# Patient Record
Sex: MALE | Race: WHITE
[De-identification: names, ages, dates, MRNs, and addresses within clinical notes are randomized per-mention and may not be internally consistent; named-entity substitution may affect disease eponyms.]

---

## 2020-06-23 ENCOUNTER — HOSPITAL ENCOUNTER (OUTPATIENT)
Dept: HOSPITAL 8 - CFH | Age: 65
Discharge: HOME | End: 2020-06-23
Attending: INTERNAL MEDICINE
Payer: MEDICARE

## 2020-06-23 DIAGNOSIS — R93.1: Primary | ICD-10-CM

## 2020-06-23 PROCEDURE — 78452 HT MUSCLE IMAGE SPECT MULT: CPT

## 2020-06-23 PROCEDURE — 93017 CV STRESS TEST TRACING ONLY: CPT

## 2020-06-23 PROCEDURE — A9502 TC99M TETROFOSMIN: HCPCS

## 2023-05-08 PROBLEM — G56.02 LEFT CARPAL TUNNEL SYNDROME: Status: ACTIVE | Noted: 2023-05-08

## 2023-05-08 PROBLEM — M25.532 LEFT WRIST PAIN: Status: ACTIVE | Noted: 2023-05-08

## 2024-04-12 PROBLEM — M19.132: Status: ACTIVE | Noted: 2024-04-12

## 2024-04-12 PROBLEM — M18.12 OSTEOARTHRITIS OF THUMB, LEFT: Status: ACTIVE | Noted: 2024-04-12

## 2024-05-07 PROBLEM — G57.61 MORTON'S NEUROMA OF THIRD INTERSPACE OF RIGHT FOOT: Status: ACTIVE | Noted: 2024-05-07

## 2025-04-06 ENCOUNTER — APPOINTMENT (OUTPATIENT)
Dept: RADIOLOGY | Facility: MEDICAL CENTER | Age: 70
End: 2025-04-06
Attending: EMERGENCY MEDICINE
Payer: COMMERCIAL

## 2025-04-06 ENCOUNTER — HOSPITAL ENCOUNTER (EMERGENCY)
Facility: MEDICAL CENTER | Age: 70
End: 2025-04-06
Attending: EMERGENCY MEDICINE
Payer: COMMERCIAL

## 2025-04-06 VITALS
WEIGHT: 155 LBS | HEIGHT: 67 IN | TEMPERATURE: 98.3 F | RESPIRATION RATE: 17 BRPM | BODY MASS INDEX: 24.33 KG/M2 | OXYGEN SATURATION: 99 % | SYSTOLIC BLOOD PRESSURE: 108 MMHG | HEART RATE: 62 BPM | DIASTOLIC BLOOD PRESSURE: 69 MMHG

## 2025-04-06 DIAGNOSIS — S16.1XXA STRAIN OF NECK MUSCLE, INITIAL ENCOUNTER: ICD-10-CM
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
## 2025-04-06 DIAGNOSIS — S06.5XAA SUBDURAL HEMATOMA (HCC): ICD-10-CM

## 2025-04-06 PROBLEM — T14.90XA TRAUMA: Status: ACTIVE | Noted: 2025-04-06

## 2025-04-06 PROBLEM — S06.5X9A: Status: ACTIVE | Noted: 2025-04-06

## 2025-04-06 LAB
ALBUMIN SERPL BCP-MCNC: 4.3 G/DL (ref 3.2–4.9)
ALBUMIN/GLOB SERPL: 1.7 G/DL
ALP SERPL-CCNC: 59 U/L (ref 30–99)
ALT SERPL-CCNC: 28 U/L (ref 2–50)
ANION GAP SERPL CALC-SCNC: 11 MMOL/L (ref 7–16)
APTT PPP: 27.3 SEC (ref 24.7–36)
AST SERPL-CCNC: 39 U/L (ref 12–45)
BASOPHILS # BLD AUTO: 0.4 % (ref 0–1.8)
BASOPHILS # BLD: 0.03 K/UL (ref 0–0.12)
BILIRUB SERPL-MCNC: 0.8 MG/DL (ref 0.1–1.5)
BUN SERPL-MCNC: 17 MG/DL (ref 8–22)
CALCIUM ALBUM COR SERPL-MCNC: 9.4 MG/DL (ref 8.5–10.5)
CALCIUM SERPL-MCNC: 9.6 MG/DL (ref 8.5–10.5)
CFT BLD TEG: 4.9 MIN (ref 4.6–9.1)
CFT P HPASE BLD TEG: 6.8 MIN (ref 4.3–8.3)
CHLORIDE SERPL-SCNC: 104 MMOL/L (ref 96–112)
CLOT ANGLE BLD TEG: 73.4 DEGREES (ref 63–78)
CO2 SERPL-SCNC: 23 MMOL/L (ref 20–33)
CREAT SERPL-MCNC: 0.93 MG/DL (ref 0.5–1.4)
CT.EXTRINSIC BLD ROTEM: 1.3 MIN (ref 0.8–2.1)
EOSINOPHIL # BLD AUTO: 0.08 K/UL (ref 0–0.51)
EOSINOPHIL NFR BLD: 1.1 % (ref 0–6.9)
ERYTHROCYTE [DISTWIDTH] IN BLOOD BY AUTOMATED COUNT: 43.1 FL (ref 35.9–50)
GFR SERPLBLD CREATININE-BSD FMLA CKD-EPI: 87 ML/MIN/1.73 M 2
GLOBULIN SER CALC-MCNC: 2.5 G/DL (ref 1.9–3.5)
GLUCOSE SERPL-MCNC: 95 MG/DL (ref 65–99)
HCT VFR BLD AUTO: 43.1 % (ref 42–52)
HGB BLD-MCNC: 14.8 G/DL (ref 14–18)
IMM GRANULOCYTES # BLD AUTO: 0.02 K/UL (ref 0–0.11)
IMM GRANULOCYTES NFR BLD AUTO: 0.3 % (ref 0–0.9)
INR PPP: 1.07 (ref 0.87–1.13)
LYMPHOCYTES # BLD AUTO: 1.49 K/UL (ref 1–4.8)
LYMPHOCYTES NFR BLD: 20.1 % (ref 22–41)
MCF BLD TEG: 57 MM (ref 52–69)
MCF.PLATELET INHIB BLD ROTEM: 19 MM (ref 15–32)
MCH RBC QN AUTO: 31.8 PG (ref 27–33)
MCHC RBC AUTO-ENTMCNC: 34.3 G/DL (ref 32.3–36.5)
MCV RBC AUTO: 92.7 FL (ref 81.4–97.8)
MONOCYTES # BLD AUTO: 0.49 K/UL (ref 0–0.85)
MONOCYTES NFR BLD AUTO: 6.6 % (ref 0–13.4)
NEUTROPHILS # BLD AUTO: 5.31 K/UL (ref 1.82–7.42)
NEUTROPHILS NFR BLD: 71.5 % (ref 44–72)
NRBC # BLD AUTO: 0 K/UL
NRBC BLD-RTO: 0 /100 WBC (ref 0–0.2)
PA AA BLD-ACNC: 0 % (ref 0–11)
PA ADP BLD-ACNC: 4.6 % (ref 0–17)
PLATELET # BLD AUTO: 185 K/UL (ref 164–446)
PMV BLD AUTO: 9.3 FL (ref 9–12.9)
POTASSIUM SERPL-SCNC: 4.4 MMOL/L (ref 3.6–5.5)
PROT SERPL-MCNC: 6.8 G/DL (ref 6–8.2)
PROTHROMBIN TIME: 13.9 SEC (ref 12–14.6)
RBC # BLD AUTO: 4.65 M/UL (ref 4.7–6.1)
SODIUM SERPL-SCNC: 138 MMOL/L (ref 135–145)
TEG ALGORITHM TGALG: NORMAL
WBC # BLD AUTO: 7.4 K/UL (ref 4.8–10.8)

## 2025-04-06 PROCEDURE — 85384 FIBRINOGEN ACTIVITY: CPT

## 2025-04-06 PROCEDURE — 36415 COLL VENOUS BLD VENIPUNCTURE: CPT

## 2025-04-06 PROCEDURE — 85730 THROMBOPLASTIN TIME PARTIAL: CPT

## 2025-04-06 PROCEDURE — 80053 COMPREHEN METABOLIC PANEL: CPT

## 2025-04-06 PROCEDURE — 99284 EMERGENCY DEPT VISIT MOD MDM: CPT

## 2025-04-06 PROCEDURE — 70450 CT HEAD/BRAIN W/O DYE: CPT

## 2025-04-06 PROCEDURE — 99284 EMERGENCY DEPT VISIT MOD MDM: CPT | Performed by: SURGERY

## 2025-04-06 PROCEDURE — 85576 BLOOD PLATELET AGGREGATION: CPT | Mod: 91

## 2025-04-06 PROCEDURE — 71045 X-RAY EXAM CHEST 1 VIEW: CPT

## 2025-04-06 PROCEDURE — 72125 CT NECK SPINE W/O DYE: CPT

## 2025-04-06 PROCEDURE — 85025 COMPLETE CBC W/AUTO DIFF WBC: CPT

## 2025-04-06 PROCEDURE — 85610 PROTHROMBIN TIME: CPT

## 2025-04-06 PROCEDURE — 85347 COAGULATION TIME ACTIVATED: CPT

## 2025-04-06 PROCEDURE — 305948 HCHG GREEN TRAUMA ACT PRE-NOTIFY NO CC

## 2025-04-06 RX ORDER — EZETIMIBE 10 MG/1
10 TABLET ORAL DAILY
COMMUNITY

## 2025-04-06 RX ORDER — TIMOLOL MALEATE 5 MG/ML
1 SOLUTION/ DROPS OPHTHALMIC 2 TIMES DAILY
COMMUNITY

## 2025-04-06 RX ORDER — ROSUVASTATIN CALCIUM 10 MG/1
10 TABLET, COATED ORAL EVERY EVENING
COMMUNITY

## 2025-04-06 ASSESSMENT — PAIN DESCRIPTION - PAIN TYPE: TYPE: ACUTE PAIN
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
## 2025-04-06 NOTE — ED NOTES
Written and verbal instructions provided to pt. Pt instructed to follow up with PCP, [facility], and Occupational Health. Pt instructed to return to emergency department for new or worsening symptoms. Pt verbalized understanding of discharge instructions.
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
## 2025-04-06 NOTE — ED NOTES
Rounded on pt. Pt updated on POC. Pt reports no needs at this time. Call light within reach, bed locked and in the lowest position.

## 2025-04-06 NOTE — ED NOTES
Medication history reviewed with patient at bedside.   Med rec is complete  Allergies reviewed.     Patient has not had any outpatient antibiotics in the last 30 days.   Anticoagulants: No  Dispense history available in EPIC: No    Jessica Ramírez
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
## 2025-04-06 NOTE — ED PROVIDER NOTES
"ED Provider Note    CHIEF COMPLAINT  Chief Complaint   Patient presents with    Trauma Green     Pt B[facility] from [facility]. Per report Pt was a snowboarder who caught an edge and fell forward. PT was AOx3 minus event. Presents in C-collar. C/O neck pain, left upper back pain and low back pain. GCS 15, AOx 4 upon arrival.  No pain medication given PTA.          HPI/ROS  LIMITATION TO HISTORY   Amnestic to events    Brian Carr is a 69 y.o. male who presents via EMS as a trauma alert, snowboarder who fell, loss of conscious having head and neck pain.  He reports the neck pain is localized to the left region of his lower neck.  No weakness numbness or tingling.  No chest or abdominal pain.  Somewhat of an exacerbated chronic left-sided low back pain.  He was helmeted.  Takes no anticoagulation.  Not on aspirin or Plavix.  Reports he had a recent oral surgery and has not even been taking any NSAIDs for several weeks.  No shortness of breath.  No other complaints    PAST MEDICAL HISTORY       SURGICAL HISTORY  patient denies any surgical history    FAMILY HISTORY  History reviewed. No pertinent family history.    SOCIAL HISTORY  Social History     Tobacco Use    Smoking status: Never    Smokeless tobacco: Not on file   Substance and Sexual Activity    Alcohol use: Yes     Comment: occ    Drug use: Never    Sexual activity: Not on file       CURRENT MEDICATIONS  Home Medications       Reviewed by Renata Sinclair R.N. (Registered Nurse) on 04/06/25 at 1047  Med List Status: <None>     Medication Last Dose Status        Patient Hernesto Taking any Medications                         Audit from Redirected Encounters    **Home medications have not yet been reviewed for this encounter**         ALLERGIES  No Known Allergies    PHYSICAL EXAM  VITAL SIGNS: /73   Pulse 60   Temp 36.8 °C (98.3 °F) (Temporal)   Resp 16   Ht 1.702 m (5' 7\")   Wt 70.3 kg (155 lb)   SpO2 97%   BMI 24.28 kg/m²    Primary " survey:  Airway is intact  Symmetric breath sounds bilaterally  2+ radial and dorsalis pedis pulses bilaterally  GCS 15  Thoracic and lumbar spine is nontender, no step-offs or other deformities appreciated.     Secondary survey:  Constitutional: An alert patient in no acute distress  HENT: Head is atraumatic.  Eyes: Pupils are equal and reactive to light.   Neck: C-collar in place, the C-spine is tender overlying the lower C-spine around C6, no step-offs or deformities appreciated.  Also has some left paraspinal muscular tenderness.  Cardiovascular: Regular rhythm.   Thorax & Lungs: Chest is nontender. No ecchymosis, abrasions or other traumatic injury noted.  Lungs are clear to auscultation with good air movement bilaterally.  Abdomen: Soft, with no tenderness. No ecchymosis, abrasions or other traumatic injury noted.  Extremities/Musculoskeletal: No sign of trauma. No tenderness. Normal range of motion   Neurologic: Alert & oriented. No focal deficits observed.      EKG/LABS    Results for orders placed or performed during the hospital encounter of 04/06/25   CBC WITH DIFFERENTIAL    Collection Time: 04/06/25 12:00 PM   Result Value Ref Range    WBC 7.4 4.8 - 10.8 K/uL    RBC 4.65 (L) 4.70 - 6.10 M/uL    Hemoglobin 14.8 14.0 - 18.0 g/dL    Hematocrit 43.1 42.0 - 52.0 %    MCV 92.7 81.4 - 97.8 fL    MCH 31.8 27.0 - 33.0 pg    MCHC 34.3 32.3 - 36.5 g/dL    RDW 43.1 35.9 - 50.0 fL    Platelet Count 185 164 - 446 K/uL    MPV 9.3 9.0 - 12.9 fL    Neutrophils-Polys 71.50 44.00 - 72.00 %    Lymphocytes 20.10 (L) 22.00 - 41.00 %    Monocytes 6.60 0.00 - 13.40 %    Eosinophils 1.10 0.00 - 6.90 %    Basophils 0.40 0.00 - 1.80 %    Immature Granulocytes 0.30 0.00 - 0.90 %    Nucleated RBC 0.00 0.00 - 0.20 /100 WBC    Neutrophils (Absolute) 5.31 1.82 - 7.42 K/uL    Lymphs (Absolute) 1.49 1.00 - 4.80 K/uL    Monos (Absolute) 0.49 0.00 - 0.85 K/uL    Eos (Absolute) 0.08 0.00 - 0.51 K/uL    Baso (Absolute) 0.03 0.00 - 0.12 K/uL     Immature Granulocytes (abs) 0.02 0.00 - 0.11 K/uL    NRBC (Absolute) 0.00 K/uL   COMP METABOLIC PANEL    Collection Time: 04/06/25 12:00 PM   Result Value Ref Range    Sodium 138 135 - 145 mmol/L    Potassium 4.4 3.6 - 5.5 mmol/L    Chloride 104 96 - 112 mmol/L    Co2 23 20 - 33 mmol/L    Anion Gap 11.0 7.0 - 16.0    Glucose 95 65 - 99 mg/dL    Bun 17 8 - 22 mg/dL    Creatinine 0.93 0.50 - 1.40 mg/dL    Calcium 9.6 8.5 - 10.5 mg/dL    Correct Calcium 9.4 8.5 - 10.5 mg/dL    AST(SGOT) 39 12 - 45 U/L    ALT(SGPT) 28 2 - 50 U/L    Alkaline Phosphatase 59 30 - 99 U/L    Total Bilirubin 0.8 0.1 - 1.5 mg/dL    Albumin 4.3 3.2 - 4.9 g/dL    Total Protein 6.8 6.0 - 8.2 g/dL    Globulin 2.5 1.9 - 3.5 g/dL    A-G Ratio 1.7 g/dL   PROTHROMBIN TIME (INR)    Collection Time: 04/06/25 12:00 PM   Result Value Ref Range    PT 13.9 12.0 - 14.6 sec    INR 1.07 0.87 - 1.13   APTT    Collection Time: 04/06/25 12:00 PM   Result Value Ref Range    APTT 27.3 24.7 - 36.0 sec   PLATELET MAPPING WITH BASIC TEG    Collection Time: 04/06/25 12:00 PM   Result Value Ref Range    Reaction Time Initial-R 4.9 4.6 - 9.1 min    React Time Initial Hep 6.8 4.3 - 8.3 min    Clot Kinetics-K 1.3 0.8 - 2.1 min    Clot Angle-Angle 73.4 63.0 - 78.0 degrees    Maximum Clot Strength-MA 57.0 52.0 - 69.0 mm    TEG Functional Fibrinogen(MA) 19.0 15.0 - 32.0 mm    % Inhibition ADP 4.6 0.0 - 17.0 %    % Inhibition AA 0.0 0.0 - 11.0 %    TEG Algorithm Link Algorithm    ESTIMATED GFR    Collection Time: 04/06/25 12:00 PM   Result Value Ref Range    GFR (CKD-EPI) 87 >60 mL/min/1.73 m 2      I have independently interpreted this EKG    RADIOLOGY/PROCEDURES   I have independently interpreted the diagnostic imaging associated with this visit and am waiting the final reading from the radiologist.   My preliminary interpretation is as follows: No pneumothorax    Radiologist interpretation:  CT-CSPINE WITHOUT PLUS RECONS   Final Result      1.  No cervical spine fracture  or subluxation.   2.  Moderate degenerative changes.      CT-HEAD W/O   Final Result      1.  Small RIGHT parietal subdural hematoma.   2.  No significant mass effect or midline shift.      Based solely on CT findings, the brain injury guideline category is mBIG 1.      SDH < 4mm   IPH < 4mm   SAH < 3 sulci and < 1mm      The original BIG retrospective analysis found radiographic progression in 0% of BIG 1 patients and 2.6% BIG 2.      These findings were discussed with SHABBIR HERNANDEZ on 4/6/2025 11:15 AM.            DX-CHEST-LIMITED (1 VIEW)   Final Result      No acute process.          COURSE & MEDICAL DECISION MAKING    ASSESSMENT, COURSE AND PLAN  Care Narrative: 69-year-old male, snowboarding crash helmeted, hit his head lost consciousness, CT scan reveals a big 1 subdural hematoma.  He is not on any antiplatelet or anticoagulants.  Will obtain some blood work, will consult trauma per our big 1 protocol.  He has some neck pain, CT is negative for obvious spinal injury, he has no focal neurologic plaints or findings.  His c-collar has been cleared.    4:19 PM 6 hours into the patient's visit he is awake, alert, feels well.  No focal neurologic complaints or findings.  Has been evaluated by the trauma team.  At this point he is discharged home in stable condition to follow-up with occupational medicine.  Return instructions provided.        DISPOSITION AND DISCUSSIONS  I have discussed management of the patient with the following physicians and EDWARD's: Dr. Goddard, trauma surgery    FINAL DIAGNOSIS  1. Subdural hematoma (HCC)    2. Snowboard accident, initial encounter    3. Strain of neck muscle, initial encounter         Electronically signed by: Shabbir Hernandez M.D., 4/6/2025 11:42 AM

## 2025-04-06 NOTE — ED NOTES
Rounded on pt. Pt resting comfortably in [facility]. Pt reports no needs at this time. Call light is within reach.

## 2025-04-06 NOTE — ED NOTES
Pt was snowboarding this morning, caught and edge and fell forward. AOx 3 for EMS, AOx 4 upon arrival. C-collar in place upon arrival. Pt c/o left upper back pain, low back pain.

## 2025-04-14 ENCOUNTER — TELEPHONE (OUTPATIENT)
Dept: OCCUPATIONAL MEDICINE | Facility: CLINIC | Age: 70
End: 2025-04-14
Payer: MEDICARE

## 2025-04-15 ENCOUNTER — OCCUPATIONAL MEDICINE (OUTPATIENT)
Dept: OCCUPATIONAL MEDICINE | Facility: CLINIC | Age: 70
End: 2025-04-15
Payer: COMMERCIAL

## 2025-04-15 VITALS
OXYGEN SATURATION: 98 % | BODY MASS INDEX: 24.64 KG/M2 | RESPIRATION RATE: 16 BRPM | WEIGHT: 157 LBS | HEART RATE: 66 BPM | HEIGHT: 67 IN | DIASTOLIC BLOOD PRESSURE: 76 MMHG | SYSTOLIC BLOOD PRESSURE: 122 MMHG | TEMPERATURE: 97.9 F

## 2025-04-15 DIAGNOSIS — S06.5XAA SUBDURAL HEMATOMA (HCC): ICD-10-CM

## 2025-04-15 PROCEDURE — 99213 OFFICE O/P EST LOW 20 MIN: CPT | Performed by: NURSE PRACTITIONER

## 2025-04-15 ASSESSMENT — FIBROSIS 4 INDEX: FIB4 SCORE: 2.83

## 2025-04-15 NOTE — LETTER
PHYSICIAN’S AND CHIROPRACTIC PHYSICIAN'S   PROGRESS REPORT   CERTIFICATION OF DISABILITY Claim Number:     Social Security Number:    Patient’s Name: Tylor Esparza Jr. Date of Injury: 4/6/2025   Employer: [employer] Name of MCO (if applicable):      Patient’s Job Description/Occupation:        Previous Injuries/Diseases/Surgeries Contributing to the Condition:         Diagnosis: (S06.5XAA) Subdural hematoma (HCC)      Related to the Industrial Injury? Yes     Explain: DOI: 4/6/2025.  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        Objective Medical Findings: HENT: Head is atraumatic. Pupils are equal and reactive to light.  EOMI. cranial nerves I through XII grossly intact.  Face is symmetrical.  No focal deficits.  Ax Ox 3.  Patient responds and answers questions appropriately in a timely manner.  Romberg negative.  Neck: C-spine is mildly tender overlying the lower C-spine around C6, no step-offs or deformities appreciated.  Also has some left paraspinal muscular tenderness.  No JVD.  No cervical JVD.  No decrease in rotation.  No cervical adenopathy.           None - Discharged                         Stable  Yes                 Ratable  No     X   Generally Improved                         Condition Worsened               X   Condition Same  May Have Suffered a Permanent Disability No     Treatment Plan:    Follow-up in 1 week  Continue with acetaminophen as needed, ice/heat application, limit screen time and loud noises, get plenty of rest, stay   Go to ER: extreme sudden fatigue, severe headache, short-term memory loss, slurred speech, pale skin, changes in behavior, constant nausea with vomiting, or loss of consciousness,             No Change in Therapy                  PT/OT Prescribed                      Medication May be Used While Working        Case Management                          PT/OT  Discontinued    Consultation    Further Diagnostic Studies:    Prescription(s)                 Released to FULL DUTY /No Restrictions on (Date):       Certified TOTALLY TEMPORARILY DISABLED (Indicate Dates) From:   To:    X  Released to RESTRICTED/Modified Duty on (Date): From: 4/15/2025 To: 4/22/2025   Restrictions Are:  Temporary      No Sitting    No Standing    No Pulling Other: Must wear helmet and may do seated duties as assigned    Avoid ladders       No Bending at Waist     No Stooping     No Lifting        No Carrying     No Walking Lifting Restricted to (lbs.):          No Pushing        No Climbing     No Reaching Above Shoulders       Date of Next Visit:  Tuesday 4/22/2025  @ 2:45 PM  Date of this Exam: 4/15/2025 Physician/Chiropractic Physician Name: GILBERTO Gibson Physician/Chiropractic Physician Signature:  Ruben Nielsen DO MPH     [place]:  [address] [place] [address], [address] [place], [place] [address] - Telephone [phone] [place]:  [address] [place] [address], [address] [place], [place] [address] - Telephone [phone]    [email or web address]  D-39 (Rev. 10/24)

## 2025-04-15 NOTE — PROGRESS NOTES
"Subjective:     Tylor Esparza Jr. is a 69 y.o. male who presents for Other (DOI 04/09/2025, Neck, Room 16)    DOI: 4/6/25. 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       Was seen in ED x 1.  Diagnostic imaging demonstrated a small right parietal subdural hematoma.  Today patient states that he is feeling significantly better.  He does have some neck soreness but otherwise has no new or worsening symptoms.  No weakness numbness or tingling. He was helmeted. Takes no anticoagulation. Not on aspirin or Plavix.  He denies nausea, vomiting, headaches, photophobia, phonophobia, fever, dizziness, chills, focal weakness, or bowel bladder changes.  He states that he has had tinnitus for 40+ years and it has no new or worsening than it has been.  He is taking Profen for his neck pain but he was seen by his primary care who recommended that he take acetaminophen.  He has been using ice and heat on his neck.  He is ready to return to work.  Discussed please 1 week follow-up and he is okay with that.  He states that his job is very minimal he has no lifting, no climbing, and he is instructed to wear a helmet at all times as this is required per employer. Continue light duty . Plan of care discussed with patient.     ROS: All systems were reviewed on intake form, form was reviewed and signed. See scanned documents in media. Pertinent positives and negatives included in HPI.    PMH: No pertinent past medical history to this problem  MEDS: Medications were reviewed in Epic  ALLERGIES: No Known Allergies  SOCHX: Works as  at [employer]   FH: No pertinent family history to this problem       Objective:     /76   Pulse 66   Temp 36.6 °C (97.9 °F) (Temporal)   Resp 16   Ht 1.702 m (5' 7\")   Wt 71.2 kg (157 lb)   SpO2 98%   BMI 24.59 kg/m²     [unfilled]    HENT: Head is atraumatic. Pupils are equal and reactive to light.  EOMI. cranial nerves I through XII " grossly intact.  Face is symmetrical.  No focal deficits.  Ax Ox 3.  Patient responds and answers questions appropriately in a timely manner.  Romberg negative.  Neck: C-spine is mildly tender overlying the lower C-spine around C6, no step-offs or deformities appreciated.  Also has some left paraspinal muscular tenderness.  No JVD.  No cervical JVD.  No decrease in rotation.  No cervical adenopathy.      Assessment/Plan:       1. Subdural hematoma (HCC)    Must wear helmet and may do seated duties as assigned    Avoid ladders       Differential diagnosis, natural history, supportive care, and indications for immediate follow-up discussed.    Approximately 35 minutes were spent in reviewing notes, preparing for visit, obtaining history, exam and evaluation, patient counseling/education and post visit documentation/orders.

## 2025-04-22 ENCOUNTER — TELEPHONE (OUTPATIENT)
Dept: OCCUPATIONAL MEDICINE | Facility: CLINIC | Age: 70
End: 2025-04-22
Payer: MEDICARE

## 2025-04-23 ENCOUNTER — OCCUPATIONAL MEDICINE (OUTPATIENT)
Dept: OCCUPATIONAL MEDICINE | Facility: CLINIC | Age: 70
End: 2025-04-23
Payer: COMMERCIAL

## 2025-04-23 DIAGNOSIS — S06.5XAA SUBDURAL HEMATOMA (HCC): ICD-10-CM

## 2025-04-23 PROCEDURE — 99213 OFFICE O/P EST LOW 20 MIN: CPT | Performed by: NURSE PRACTITIONER

## 2025-04-23 ASSESSMENT — ENCOUNTER SYMPTOMS
MYALGIAS: 0
VOMITING: 0
PHOTOPHOBIA: 0
FOCAL WEAKNESS: 0
TINGLING: 0
PSYCHIATRIC NEGATIVE: 1
LOSS OF CONSCIOUSNESS: 0
SENSORY CHANGE: 0
DOUBLE VISION: 0
RESPIRATORY NEGATIVE: 1
NECK PAIN: 0
DIZZINESS: 0
CARDIOVASCULAR NEGATIVE: 1
HEADACHES: 0
CONSTITUTIONAL NEGATIVE: 1
BRUISES/BLEEDS EASILY: 0
BLURRED VISION: 0
NAUSEA: 0

## 2025-04-23 NOTE — LETTER
PHYSICIAN’S AND CHIROPRACTIC PHYSICIAN'S   PROGRESS REPORT   CERTIFICATION OF DISABILITY Claim Number:     Social Security Number:    Patient’s Name: Tylor Esparza Jr. Date of Injury: 4/6/2025   Employer: [employer] Name of MCO (if applicable):      Patient’s Job Description/Occupation:        Previous Injuries/Diseases/Surgeries Contributing to the Condition:         Diagnosis: (S06.5XAA) Subdural hematoma (HCC)      Related to the Industrial Injury? Yes     Explain: DOI: 4/6/25. 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       Objective Medical Findings: HENT: Head is atraumatic. Pupils are equal and reactive to light.  EOMI. cranial nerves I through XII grossly intact.  Face is symmetrical.  No focal deficits.  Ax Ox 3.  Patient responds and answers questions appropriately in a timely manner.  Romberg negative.  Neck: C-spine is non tender overlying the lower C-spine around C6, no step-offs or deformities appreciated.  Also has some left paraspinal muscular tenderness.  No JVD.  No cervical JVD.  No decrease in rotation.  No cervical adenopathy.           None - Discharged                         Stable  Yes                 Ratable  No     X   Generally Improved                         Condition Worsened                  Condition Same  May Have Suffered a Permanent Disability No     Treatment Plan:    Follow-up in 4 weeks or sooner once CT scan is completed  Continue with acetaminophen as needed, ice/heat application, get rest, stay hydrated, continue with home meds  Return to clinic sooner if needed for further evaluation and management of new or worsening symptoms           No Change in Therapy                  PT/OT Prescribed                      Medication May be Used While Working        Case Management                          PT/OT Discontinued    Consultation    Further Diagnostic Studies:    Prescription(s)      Repeat CT scan ordered        X   Released to FULL DUTY /No Restrictions on (Date):  4/23/2025    Certified TOTALLY TEMPORARILY DISABLED (Indicate Dates) From:   To:      Released to RESTRICTED/Modified Duty on (Date): From:   To:    Restrictions Are:         No Sitting    No Standing    No Pulling Other:         No Bending at Waist     No Stooping     No Lifting        No Carrying     No Walking Lifting Restricted to (lbs.):          No Pushing        No Climbing     No Reaching Above Shoulders       Date of Next Visit:  Wednesday 5/21/2025  @ 9:45 AM  Date of this Exam: 4/23/2025 Physician/Chiropractic Physician Name: GILBERTO Gibson Physician/Chiropractic Physician Signature:  Ruben Nielsen DO MPH     [place]:  [address] [place] [address], [address] [place], [place] [address] - Telephone [phone] [place]:  [address] [place] [address], [address] [place], [place] [address] - Telephone [phone]    [email or web address]  D-39 (Rev. 10/24)

## 2025-04-23 NOTE — PROGRESS NOTES
Subjective:     Tylor Esparza Jr. is a 69 y.o. male who presents for Follow-Up (WC DOI 04/09/2025 Neck - )    DOI: 4/6/25. 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         Was seen in ED x 1.  Diagnostic imaging demonstrated a small right parietal subdural hematoma.      Today patient states he feels improved.  No longer has neck soreness.  He denies new or worsening symptoms.  No weakness numbness or tingling. He was helmeted. Takes no anticoagulation. Not on aspirin or Plavix.  He denies nausea, vomiting, headaches, photophobia, phonophobia, fever, dizziness, chills, focal weakness, or bowel bladder changes.  He states that he has had tinnitus for 40+ years and it has no new or worsening than it has been.  He is taking acetaminophen as needed but he has not needed any sincelast visit .  He has been using ice and heat on his neck.  He is ready to return to work.  Requested repeat CT scan.  He would like to return to full duty he wears a helmet and he is not want any restrictions.  He does not do any lifting, no climbing, and he is instructed to wear a helmet at all times as this is required per employer. May work full duty with a helmet per patient request. Plan of care discussed with patient.     Review of Systems   Constitutional: Negative.    HENT:  Positive for tinnitus.    Eyes:  Negative for blurred vision, double vision and photophobia.   Respiratory: Negative.     Cardiovascular: Negative.    Gastrointestinal:  Negative for nausea and vomiting.   Musculoskeletal:  Negative for myalgias and neck pain.   Neurological:  Negative for dizziness, tingling, sensory change, focal weakness, loss of consciousness and headaches.   Endo/Heme/Allergies:  Does not bruise/bleed easily.   Psychiatric/Behavioral: Negative.         SOCHX: Works as  at [employer]   FH: No pertinent family history to this problem       Objective:     There were no vitals taken for  this visit.    Constitutional: Patient is in no acute distress. Appears well-developed and well-nourished.   Cardiovascular: Normal rate.    Pulmonary/Chest: Effort normal. No respiratory distress.   Neurological: Patient is alert and oriented to person, place, and time.   Skin: Skin is warm and dry.   Psychiatric: Normal mood and affect. Behavior is normal.     HENT: Head is atraumatic. Pupils are equal and reactive to light.  EOMI. cranial nerves I through XII grossly intact.  Face is symmetrical.  No focal deficits.  Ax Ox 3.  Patient responds and answers questions appropriately in a timely manner.  Romberg negative.  Neck: C-spine is non tender overlying the lower C-spine around C6, no step-offs or deformities appreciated.  Also has some left paraspinal muscular tenderness.  No JVD.  No cervical JVD.  No decrease in rotation.  No cervical adenopathy.      Assessment/Plan:       1. Subdural hematoma (HCC)  - CT-HEAD W/O; Future  - Referral to Radiology    Follow-up in 4 weeks or sooner once CT scan is completed  Continue with acetaminophen as needed, ice/heat application, get rest, stay hydrated, continue with home meds  Return to clinic sooner if needed for further evaluation and management of new or worsening symptoms        Differential diagnosis, natural history, supportive care, and indications for immediate follow-up discussed.    Approximately 30 minutes was spent in preparing for visit, obtaining history, exam and evaluation, patient counseling/education and post visit documentation/orders.

## 2025-05-01 ENCOUNTER — HOSPITAL ENCOUNTER (OUTPATIENT)
Dept: RADIOLOGY | Facility: MEDICAL CENTER | Age: 70
End: 2025-05-01
Attending: NURSE PRACTITIONER
Payer: COMMERCIAL

## 2025-05-01 DIAGNOSIS — S06.5XAA SUBDURAL HEMATOMA (HCC): ICD-10-CM

## 2025-05-01 PROCEDURE — 70450 CT HEAD/BRAIN W/O DYE: CPT

## 2025-05-01 NOTE — Clinical Note
REFERRAL APPROVAL NOTICE         Sent on April 30, 2025                   Augusto Esparza Jr.  [address] [address]                   Dear Mr. Esparza,    After a careful review of the medical information and benefit coverage, [facility] has processed your referral. See below for additional details.    If applicable, you must be actively enrolled with your insurance for coverage of the authorized service. If you have any questions regarding your coverage, please contact your insurance directly.    REFERRAL INFORMATION   Referral #:  [id number]  Referred-To Department    Referred-By Provider:  N/A    GILBERTO Gibsno   Imaging Support [facility]      [address] [address]  [phone] [address] [place] [address]  [phone]    Referral Start Date:  04/23/2025  Referral End Date:   04/23/2026             SCHEDULING  If you do not already have an appointment, please call [phone] to make an appointment.     MORE INFORMATION  If you do not already have a [facility] account, sign up at: [email or web address]  You can access your medical information, make appointments, see lab results, billing information, and more.  If you have questions regarding this referral, please contact  the [facility] Referrals department at:             [phone]. Monday - Friday 8:00AM - 5:00PM.     Sincerely,    [facility]

## 2025-05-20 ENCOUNTER — TELEPHONE (OUTPATIENT)
Dept: OCCUPATIONAL MEDICINE | Facility: CLINIC | Age: 70
End: 2025-05-20
Payer: MEDICARE

## 2025-05-21 ENCOUNTER — OCCUPATIONAL MEDICINE (OUTPATIENT)
Dept: OCCUPATIONAL MEDICINE | Facility: CLINIC | Age: 70
End: 2025-05-21
Payer: COMMERCIAL

## 2025-05-21 VITALS
TEMPERATURE: 97.4 F | HEART RATE: 84 BPM | BODY MASS INDEX: 24.8 KG/M2 | HEIGHT: 67 IN | DIASTOLIC BLOOD PRESSURE: 84 MMHG | RESPIRATION RATE: 16 BRPM | SYSTOLIC BLOOD PRESSURE: 128 MMHG | OXYGEN SATURATION: 94 % | WEIGHT: 158 LBS

## 2025-05-21 DIAGNOSIS — S06.5XAA SUBDURAL HEMATOMA (HCC): Primary | ICD-10-CM

## 2025-05-21 PROCEDURE — 99213 OFFICE O/P EST LOW 20 MIN: CPT | Performed by: NURSE PRACTITIONER

## 2025-05-21 ASSESSMENT — ENCOUNTER SYMPTOMS
PHOTOPHOBIA: 0
SPEECH CHANGE: 0
VOMITING: 0
PSYCHIATRIC NEGATIVE: 1
FOCAL WEAKNESS: 0
WEAKNESS: 0
SEIZURES: 0
BLURRED VISION: 0
SENSORY CHANGE: 0
RESPIRATORY NEGATIVE: 1
EYE PAIN: 0
MYALGIAS: 0
CARDIOVASCULAR NEGATIVE: 1
HEADACHES: 0
DIZZINESS: 0
NAUSEA: 0
CONSTITUTIONAL NEGATIVE: 1
NECK PAIN: 0
LOSS OF CONSCIOUSNESS: 0
DOUBLE VISION: 0

## 2025-05-21 ASSESSMENT — PAIN SCALES - GENERAL: PAINLEVEL_OUTOF10: NO PAIN

## 2025-05-21 ASSESSMENT — FIBROSIS 4 INDEX: FIB4 SCORE: 2.83

## 2025-05-21 NOTE — LETTER
PHYSICIAN’S AND CHIROPRACTIC PHYSICIAN'S   PROGRESS REPORT   CERTIFICATION OF DISABILITY Claim Number:     Social Security Number:    Patient’s Name: Tylor Esparza Jr. Date of Injury: 4/6/2025   Employer: [employer] Name of MCO (if applicable):      Patient’s Job Description/Occupation:        Previous Injuries/Diseases/Surgeries Contributing to the Condition:         Diagnosis: (S06.5XAA) Subdural hematoma (HCC)  (primary encounter diagnosis)      Related to the Industrial Injury? Yes     Explain: DOI: 4/6/25. 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       Objective Medical Findings: HENT: Head is atraumatic. Pupils are equal and reactive to light.  EOMI. cranial nerves I through XII grossly intact.  Face is symmetrical.  No focal deficits.  Ax Ox 3.  Patient responds and answers questions appropriately in a timely manner.  Romberg negative.  Neck: C-spine is non tender overlying the lower C-spine around C6, no step-offs or deformities appreciated.  Also has some left paraspinal muscular tenderness.  No JVD.  No cervical JVD.  No decrease in rotation.  No cervical adenopathy.        X   None - Discharged                         Stable  Yes                 Ratable  No     X   Generally Improved                         Condition Worsened                  Condition Same  May Have Suffered a Permanent Disability No     Treatment Plan:    Discharged/MMI  Released to full duty  Follow-up if needed         No Change in Therapy                  PT/OT Prescribed                      Medication May be Used While Working        Case Management                          PT/OT Discontinued    Consultation    Further Diagnostic Studies:    Prescription(s)      Repeat CT scan 5/1/2025: Demonstrates a previously demonstrated right subdural hematoma has resolved.  No new hemorrhage noted        X  Released to FULL DUTY /No Restrictions on (Date):  5/21/2025    Certified  TOTALLY TEMPORARILY DISABLED (Indicate Dates) From:   To:      Released to RESTRICTED/Modified Duty on (Date): From:   To:    Restrictions Are:         No Sitting    No Standing    No Pulling Other:         No Bending at Waist     No Stooping     No Lifting        No Carrying     No Walking Lifting Restricted to (lbs.):          No Pushing        No Climbing     No Reaching Above Shoulders       Date of Next Visit:   Discharged/MMI  Released to full duty  Follow-up if needed Date of this Exam: 5/21/2025 Physician/Chiropractic Physician Name: GILBERTO Gibson Physician/Chiropractic Physician Signature:  Ruben Nielsen DO MPH     [place]:  [address] [place] [address], [address] [place], [place] [address] - Telephone [phone] [place]:  [address] [place] [address], [address] [place], [place] [address] - Telephone [phone]    [email or web address]  D-39 (Rev. 10/24)

## 2025-05-21 NOTE — PROGRESS NOTES
"Subjective:     Tylor Esparza Jr. is a 69 y.o. male who presents for Follow-Up (WC DOI: 4/6/25: head and neck pain - same - exam room 16/)    DOI: 4/6/25. 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         Was seen in ED x 1.  Diagnostic imaging demonstrated a small right parietal subdural hematoma.       Today patient states he feels improved.  No longer has neck soreness.  He denies new or worsening symptoms.  No weakness numbness or tingling. He was helmeted. Takes no anticoagulation. Not on aspirin or Plavix.  He denies nausea, vomiting, headaches, photophobia, phonophobia, fever, dizziness, chills, focal weakness, or bowel bladder changes.  He states that he has had tinnitus for 40+ years and it has no new or worsening than it has been.  He has not needed any acetaminophen.  He has been using ice and heat on his neck.  He is ready to return to work.  Repeat CT scan demonstrates the previous he demonstrated a right subdural hematoma that is resolved.  CT scan results reviewed with patient and no new hemorrhage noted.  He will be released from care at this time.  Plan of care discussed with patient.     Review of Systems   Constitutional: Negative.  Negative for malaise/fatigue.   HENT:  Positive for tinnitus.    Eyes:  Negative for blurred vision, double vision, photophobia and pain.   Respiratory: Negative.     Cardiovascular: Negative.    Gastrointestinal:  Negative for nausea and vomiting.   Musculoskeletal:  Negative for myalgias and neck pain.   Skin: Negative.    Neurological:  Negative for dizziness, sensory change, speech change, focal weakness, seizures, loss of consciousness, weakness and headaches.   Psychiatric/Behavioral: Negative.         SOCHX: Works as  at [employer]   FH: No pertinent family history to this problem       Objective:     /84   Pulse 84   Temp 36.3 °C (97.4 °F) (Temporal)   Resp 16   Ht 1.702 m (5' 7\")   Wt 71.7 " kg (158 lb)   SpO2 94%   BMI 24.75 kg/m²     Constitutional: Patient is in no acute distress. Appears well-developed and well-nourished.   Cardiovascular: Normal rate.    Pulmonary/Chest: Effort normal. No respiratory distress.   Neurological: Patient is alert and oriented to person, place, and time.   Skin: Skin is warm and dry.   Psychiatric: Normal mood and affect. Behavior is normal.     HENT: Head is atraumatic. Pupils are equal and reactive to light.  EOMI. cranial nerves I through XII grossly intact.  Face is symmetrical.  No focal deficits.  Ax Ox 3.  Patient responds and answers questions appropriately in a timely manner.  Romberg negative.  Neck: C-spine is non tender overlying the lower C-spine around C6, no step-offs or deformities appreciated.  Also has some left paraspinal muscular tenderness.  No JVD.  No cervical JVD.  No decrease in rotation.  No cervical adenopathy.      Assessment/Plan:       1. Subdural hematoma (HCC)    Discharged/MMI  Released to full duty  Follow-up if needed          Differential diagnosis, natural history, supportive care, and indications for immediate follow-up discussed.    Approximately 25 minutes was spent in preparing for visit, obtaining history, exam and evaluation, patient counseling/education and post visit documentation/orders.